# Patient Record
Sex: MALE | Race: WHITE | NOT HISPANIC OR LATINO | Employment: STUDENT | URBAN - METROPOLITAN AREA
[De-identification: names, ages, dates, MRNs, and addresses within clinical notes are randomized per-mention and may not be internally consistent; named-entity substitution may affect disease eponyms.]

---

## 2020-12-12 ENCOUNTER — HOSPITAL ENCOUNTER (EMERGENCY)
Facility: HOSPITAL | Age: 20
Discharge: HOME | End: 2020-12-12
Attending: EMERGENCY MEDICINE | Admitting: EMERGENCY MEDICINE
Payer: COMMERCIAL

## 2020-12-12 VITALS
OXYGEN SATURATION: 100 % | SYSTOLIC BLOOD PRESSURE: 136 MMHG | HEART RATE: 77 BPM | RESPIRATION RATE: 18 BRPM | DIASTOLIC BLOOD PRESSURE: 72 MMHG | TEMPERATURE: 98.5 F

## 2020-12-12 DIAGNOSIS — R04.0 EPISTAXIS: Primary | ICD-10-CM

## 2020-12-12 DIAGNOSIS — Z20.822 ENCOUNTER FOR SCREENING LABORATORY TESTING FOR COVID-19 VIRUS: ICD-10-CM

## 2020-12-12 PROCEDURE — U0003 INFECTIOUS AGENT DETECTION BY NUCLEIC ACID (DNA OR RNA); SEVERE ACUTE RESPIRATORY SYNDROME CORONAVIRUS 2 (SARS-COV-2) (CORONAVIRUS DISEASE [COVID-19]), AMPLIFIED PROBE TECHNIQUE, MAKING USE OF HIGH THROUGHPUT TECHNOLOGIES AS DESCRIBED BY CMS-2020-01-R: HCPCS | Performed by: EMERGENCY MEDICINE

## 2020-12-12 PROCEDURE — 99283 EMERGENCY DEPT VISIT LOW MDM: CPT

## 2020-12-12 PROCEDURE — 99282 EMERGENCY DEPT VISIT SF MDM: CPT | Performed by: EMERGENCY MEDICINE

## 2020-12-13 LAB — SARS-COV-2 RNA SPEC QL NAA+PROBE: NOT DETECTED

## 2025-03-14 ENCOUNTER — HOSPITAL ENCOUNTER (EMERGENCY)
Facility: HOSPITAL | Age: 25
Discharge: HOME | End: 2025-03-14
Attending: EMERGENCY MEDICINE
Payer: COMMERCIAL

## 2025-03-14 ENCOUNTER — APPOINTMENT (EMERGENCY)
Dept: CT IMAGING | Facility: HOSPITAL | Age: 25
End: 2025-03-14
Payer: COMMERCIAL

## 2025-03-14 ENCOUNTER — RESULTS FOLLOW-UP (OUTPATIENT)
Dept: EMERGENCY DEPT | Facility: HOSPITAL | Age: 25
End: 2025-03-14

## 2025-03-14 ENCOUNTER — APPOINTMENT (EMERGENCY)
Dept: RADIOLOGY | Facility: HOSPITAL | Age: 25
End: 2025-03-14
Payer: COMMERCIAL

## 2025-03-14 VITALS
BODY MASS INDEX: 22.45 KG/M2 | RESPIRATION RATE: 18 BRPM | SYSTOLIC BLOOD PRESSURE: 111 MMHG | WEIGHT: 148.15 LBS | DIASTOLIC BLOOD PRESSURE: 56 MMHG | OXYGEN SATURATION: 96 % | HEART RATE: 96 BPM | HEIGHT: 68 IN | TEMPERATURE: 98.5 F

## 2025-03-14 DIAGNOSIS — A41.9 SEPSIS (HCC): ICD-10-CM

## 2025-03-14 DIAGNOSIS — K52.9 ENTERITIS: Primary | ICD-10-CM

## 2025-03-14 DIAGNOSIS — E83.42 HYPOMAGNESEMIA: ICD-10-CM

## 2025-03-14 LAB
ABO GROUP BLD: NORMAL
ABO GROUP BLD: NORMAL
ALBUMIN SERPL BCG-MCNC: 5 G/DL (ref 3.5–5)
ALP SERPL-CCNC: 58 U/L (ref 34–104)
ALT SERPL W P-5'-P-CCNC: 22 U/L (ref 7–52)
ANION GAP SERPL CALCULATED.3IONS-SCNC: 11 MMOL/L (ref 4–13)
APTT PPP: 26 SECONDS (ref 23–34)
AST SERPL W P-5'-P-CCNC: 24 U/L (ref 13–39)
BACTERIA UR QL AUTO: ABNORMAL /HPF
BASOPHILS # BLD MANUAL: 0.2 THOUSAND/UL (ref 0–0.1)
BASOPHILS NFR MAR MANUAL: 1 % (ref 0–1)
BILIRUB SERPL-MCNC: 0.99 MG/DL (ref 0.2–1)
BILIRUB UR QL STRIP: NEGATIVE
BLD GP AB SCN SERPL QL: NEGATIVE
BUN SERPL-MCNC: 21 MG/DL (ref 5–25)
CALCIUM SERPL-MCNC: 10 MG/DL (ref 8.4–10.2)
CHLORIDE SERPL-SCNC: 104 MMOL/L (ref 96–108)
CLARITY UR: CLEAR
CO2 SERPL-SCNC: 23 MMOL/L (ref 21–32)
COLOR UR: ABNORMAL
CREAT SERPL-MCNC: 1.03 MG/DL (ref 0.6–1.3)
EOSINOPHIL # BLD MANUAL: 0 THOUSAND/UL (ref 0–0.4)
EOSINOPHIL NFR BLD MANUAL: 0 % (ref 0–6)
ERYTHROCYTE [DISTWIDTH] IN BLOOD BY AUTOMATED COUNT: 13 % (ref 11.6–15.1)
GFR SERPL CREATININE-BSD FRML MDRD: 100 ML/MIN/1.73SQ M
GLUCOSE SERPL-MCNC: 138 MG/DL (ref 65–140)
GLUCOSE UR STRIP-MCNC: NEGATIVE MG/DL
HCT VFR BLD AUTO: 51.2 % (ref 36.5–49.3)
HGB BLD-MCNC: 17.5 G/DL (ref 12–17)
HGB UR QL STRIP.AUTO: NEGATIVE
INR PPP: 1.01 (ref 0.85–1.19)
KETONES UR STRIP-MCNC: NEGATIVE MG/DL
LACTATE SERPL-SCNC: 1.1 MMOL/L (ref 0.5–2)
LEUKOCYTE ESTERASE UR QL STRIP: NEGATIVE
LG PLATELETS BLD QL SMEAR: PRESENT
LIPASE SERPL-CCNC: 16 U/L (ref 11–82)
LYMPHOCYTES # BLD AUTO: 0.41 THOUSAND/UL (ref 0.6–4.47)
LYMPHOCYTES # BLD AUTO: 2 % (ref 14–44)
MAGNESIUM SERPL-MCNC: 1.6 MG/DL (ref 1.9–2.7)
MCH RBC QN AUTO: 30.8 PG (ref 26.8–34.3)
MCHC RBC AUTO-ENTMCNC: 34.2 G/DL (ref 31.4–37.4)
MCV RBC AUTO: 90 FL (ref 82–98)
MONOCYTES # BLD AUTO: 1.02 THOUSAND/UL (ref 0–1.22)
MONOCYTES NFR BLD: 5 % (ref 4–12)
NEUTROPHILS # BLD MANUAL: 18.81 THOUSAND/UL (ref 1.85–7.62)
NEUTS BAND NFR BLD MANUAL: 8 % (ref 0–8)
NEUTS SEG NFR BLD AUTO: 84 % (ref 43–75)
NITRITE UR QL STRIP: NEGATIVE
NON-SQ EPI CELLS URNS QL MICRO: ABNORMAL /HPF
PH UR STRIP.AUTO: 6.5 [PH]
PLATELET # BLD AUTO: 239 THOUSANDS/UL (ref 149–390)
PLATELET BLD QL SMEAR: ADEQUATE
PMV BLD AUTO: 10 FL (ref 8.9–12.7)
POTASSIUM SERPL-SCNC: 4.4 MMOL/L (ref 3.5–5.3)
PROCALCITONIN SERPL-MCNC: 2.32 NG/ML
PROT SERPL-MCNC: 7.7 G/DL (ref 6.4–8.4)
PROT UR STRIP-MCNC: ABNORMAL MG/DL
PROTHROMBIN TIME: 14 SECONDS (ref 12.3–15)
RBC # BLD AUTO: 5.69 MILLION/UL (ref 3.88–5.62)
RBC #/AREA URNS AUTO: ABNORMAL /HPF
RBC MORPH BLD: NORMAL
RH BLD: POSITIVE
RH BLD: POSITIVE
SODIUM SERPL-SCNC: 138 MMOL/L (ref 135–147)
SP GR UR STRIP.AUTO: >=1.05 (ref 1–1.03)
SPECIMEN EXPIRATION DATE: NORMAL
UROBILINOGEN UR STRIP-ACNC: <2 MG/DL
WBC # BLD AUTO: 20.45 THOUSAND/UL (ref 4.31–10.16)
WBC #/AREA URNS AUTO: ABNORMAL /HPF

## 2025-03-14 PROCEDURE — 83605 ASSAY OF LACTIC ACID: CPT

## 2025-03-14 PROCEDURE — 83735 ASSAY OF MAGNESIUM: CPT | Performed by: EMERGENCY MEDICINE

## 2025-03-14 PROCEDURE — 71046 X-RAY EXAM CHEST 2 VIEWS: CPT

## 2025-03-14 PROCEDURE — 99285 EMERGENCY DEPT VISIT HI MDM: CPT | Performed by: EMERGENCY MEDICINE

## 2025-03-14 PROCEDURE — 85730 THROMBOPLASTIN TIME PARTIAL: CPT

## 2025-03-14 PROCEDURE — 36415 COLL VENOUS BLD VENIPUNCTURE: CPT

## 2025-03-14 PROCEDURE — 96361 HYDRATE IV INFUSION ADD-ON: CPT

## 2025-03-14 PROCEDURE — 96375 TX/PRO/DX INJ NEW DRUG ADDON: CPT

## 2025-03-14 PROCEDURE — 99284 EMERGENCY DEPT VISIT MOD MDM: CPT

## 2025-03-14 PROCEDURE — 81001 URINALYSIS AUTO W/SCOPE: CPT

## 2025-03-14 PROCEDURE — 80053 COMPREHEN METABOLIC PANEL: CPT

## 2025-03-14 PROCEDURE — 85610 PROTHROMBIN TIME: CPT

## 2025-03-14 PROCEDURE — 87040 BLOOD CULTURE FOR BACTERIA: CPT

## 2025-03-14 PROCEDURE — 83690 ASSAY OF LIPASE: CPT

## 2025-03-14 PROCEDURE — 74177 CT ABD & PELVIS W/CONTRAST: CPT

## 2025-03-14 PROCEDURE — 86900 BLOOD TYPING SEROLOGIC ABO: CPT

## 2025-03-14 PROCEDURE — 86850 RBC ANTIBODY SCREEN: CPT

## 2025-03-14 PROCEDURE — 84145 PROCALCITONIN (PCT): CPT

## 2025-03-14 PROCEDURE — 86901 BLOOD TYPING SEROLOGIC RH(D): CPT

## 2025-03-14 PROCEDURE — 85007 BL SMEAR W/DIFF WBC COUNT: CPT

## 2025-03-14 PROCEDURE — 85027 COMPLETE CBC AUTOMATED: CPT

## 2025-03-14 PROCEDURE — 96365 THER/PROPH/DIAG IV INF INIT: CPT

## 2025-03-14 RX ORDER — CIPROFLOXACIN 500 MG/1
500 TABLET, FILM COATED ORAL 2 TIMES DAILY
Qty: 14 TABLET | Refills: 0 | Status: SHIPPED | OUTPATIENT
Start: 2025-03-14 | End: 2025-03-21

## 2025-03-14 RX ORDER — METRONIDAZOLE 500 MG/1
500 TABLET ORAL ONCE
Status: COMPLETED | OUTPATIENT
Start: 2025-03-14 | End: 2025-03-14

## 2025-03-14 RX ORDER — SODIUM CHLORIDE 9 MG/ML
150 INJECTION, SOLUTION INTRAVENOUS CONTINUOUS
Status: DISCONTINUED | OUTPATIENT
Start: 2025-03-14 | End: 2025-03-14 | Stop reason: HOSPADM

## 2025-03-14 RX ORDER — ONDANSETRON 4 MG/1
4 TABLET, ORALLY DISINTEGRATING ORAL EVERY 6 HOURS PRN
Qty: 20 TABLET | Refills: 0 | Status: SHIPPED | OUTPATIENT
Start: 2025-03-14 | End: 2025-03-14

## 2025-03-14 RX ORDER — ACETAMINOPHEN 10 MG/ML
1000 INJECTION, SOLUTION INTRAVENOUS ONCE
Status: COMPLETED | OUTPATIENT
Start: 2025-03-14 | End: 2025-03-14

## 2025-03-14 RX ORDER — CIPROFLOXACIN 500 MG/1
500 TABLET, FILM COATED ORAL ONCE
Status: COMPLETED | OUTPATIENT
Start: 2025-03-14 | End: 2025-03-14

## 2025-03-14 RX ORDER — ONDANSETRON 4 MG/1
4 TABLET, ORALLY DISINTEGRATING ORAL EVERY 6 HOURS PRN
Qty: 20 TABLET | Refills: 0 | Status: SHIPPED | OUTPATIENT
Start: 2025-03-14 | End: 2025-03-21

## 2025-03-14 RX ORDER — MAGNESIUM SULFATE HEPTAHYDRATE 40 MG/ML
2 INJECTION, SOLUTION INTRAVENOUS ONCE
Status: DISCONTINUED | OUTPATIENT
Start: 2025-03-14 | End: 2025-03-14 | Stop reason: HOSPADM

## 2025-03-14 RX ORDER — METRONIDAZOLE 500 MG/1
500 TABLET ORAL EVERY 8 HOURS SCHEDULED
Qty: 21 TABLET | Refills: 0 | Status: SHIPPED | OUTPATIENT
Start: 2025-03-14 | End: 2025-03-21

## 2025-03-14 RX ORDER — DROPERIDOL 2.5 MG/ML
0.62 INJECTION, SOLUTION INTRAMUSCULAR; INTRAVENOUS ONCE
Status: COMPLETED | OUTPATIENT
Start: 2025-03-14 | End: 2025-03-14

## 2025-03-14 RX ADMIN — SODIUM CHLORIDE 1000 ML: 0.9 INJECTION, SOLUTION INTRAVENOUS at 02:34

## 2025-03-14 RX ADMIN — SODIUM CHLORIDE 1000 ML: 0.9 INJECTION, SOLUTION INTRAVENOUS at 01:16

## 2025-03-14 RX ADMIN — METRONIDAZOLE 500 MG: 500 TABLET ORAL at 05:06

## 2025-03-14 RX ADMIN — ACETAMINOPHEN 1000 MG: 10 INJECTION INTRAVENOUS at 01:34

## 2025-03-14 RX ADMIN — DROPERIDOL 0.62 MG: 2.5 INJECTION, SOLUTION INTRAMUSCULAR; INTRAVENOUS at 01:33

## 2025-03-14 RX ADMIN — IOHEXOL 90 ML: 350 INJECTION, SOLUTION INTRAVENOUS at 02:57

## 2025-03-14 RX ADMIN — CIPROFLOXACIN 500 MG: 500 TABLET ORAL at 05:06

## 2025-03-14 NOTE — DISCHARGE INSTRUCTIONS
Please have a low threshold to return to the Emergency Department. Return for worsening abdominal pain, fevers, chills, vomiting, diarrhea, chest pain, difficulty breathing.

## 2025-03-14 NOTE — SEPSIS NOTE
Sepsis Note   David Santiago 24 y.o. male MRN: [id number]  Unit/Bed#: ED-40 Encounter: [id number]       Initial Sepsis Screening       Row Name 03/14/25 0728                Is the patient's history suggestive of a new or worsening infection? Yes (Proceed)  -ROBERTO        Suspected source of infection acute abdominal infection  -ROBERTO        Indicate SIRS criteria Tachycardia > 90 bpm;Leukocytosis (WBC > 91220 IJL) OR Leukopenia (WBC <4000 IJL) OR Bandemia (WBC >10% bands)  -ROBERTO        Are two or more of the above signs & symptoms of infection both present and new to the patient? Yes (Proceed)  -ROBERTO        Assess for evidence of organ dysfunction: Are any of the below criteria present within 6 hours of suspected infection and SIRS criteria that are NOT considered to be chronic conditions? --                  User Key  (r) = Recorded By, (t) = Taken By, (c) = Cosigned By      Initials Name Provider Type    ROBERTO JEFFRIES DO Resident                        Body mass index is 22.53 kg/m².  Wt Readings from Last 1 Encounters:   03/14/25 67.2 kg (148 lb 2.4 oz)     IBW (Ideal Body Weight): 68.4 kg    Ideal body weight: 68.4 kg (150 lb 12.7 oz)

## 2025-03-14 NOTE — ED PROVIDER NOTES
"Time reflects when diagnosis was documented in both MDM as applicable and the Disposition within this note       Time User Action Codes Description Comment    3/14/2025  4:42 AM Cali Bautista [K52.9] Enteritis     3/14/2025  4:42 AM Cali Bautista [A41.9] Sepsis (HCC)     3/14/2025  4:42 AM Cali Bautista [E83.42] Hypomagnesemia           ED Disposition       ED Disposition   AMA    Condition   --    Date/Time   Fri Mar 14, 2025  4:42 AM    Comment   Date: 3/14/2025  Patient: David Santiago  Admitted: 3/14/2025 12:45 AM  Attending Provider: Roderick Berman MD    David Santiago or his authorized caregiver has made the decision for the patient to leave the emergency department against the advice of h is attending physician. He or his authorized caregiver has been informed and understands the inherent risks, including death, worsening of his condition, worsening abdominal infection, worsening sepsis.  He or his authorized caregiver has decided to  accept the responsibility for this decision. David Santiago and all necessary parties have been advised that he may return for further evaluation or treatment. His condition at time of discharge was guarded.  David Santiago had current vital signs as follo ws:  /56 (BP Location: Left arm)   Pulse 96   Temp 98.5 °F (36.9 °C)   Resp 18   Ht 5' 8\" (1.727 m)   Wt 67.2 kg (148 lb 2.4 oz)                Assessment & Plan       Medical Decision Making  Patient is a 24 year old male, presenting to the Emergency Department for evaluation of abdominal pain, ongoing for the past 3 days, with vomiting and diarrhea beginning tonight.    Differential diagnoses included but not limited to: gastritis, gastroenteritis, diverticulitis, appendicitis, cholecystitis, cholelithiasis.    Orders written for labs, urinalysis.    Patient initially treated with IV fluids, IV Tylenol and droperidol.    See ED course for full details.    Magnesium noted to be low at 1.9. Will " replete with 2g IV.     Labs notable for an elevated WBC of 20. Given elevated WBC and HR>90, patient meeting SIRS criteria.   Lactic acid and blood cultures ordered. CXR added.    CT Abdomen/Pelvis with IV added for further evaluation.    On re-evaluation, patient reports feeling markedly improved, and is tolerating PO fluids without difficulty.  Patient has not had any recurrence of diarrhea while in the ED.    CT results notable for findings of enteritis. Findings were reviewed and discussed with the patient, who verbalized understanding and was given the opportunity to ask questions.   Given patient's SIRS plus CT findings of enteritis, patient meeting criteria for sepsis. Not severe sepsis given normal lactic acid level.   Discussed the plan for admission, IV antibiotics and continued monitoring with the patient.  The patient states that he feels much better at this time and wishes to be discharged home.  I had an extensive conversation with the patient regarding the risks of leaving the hospital with a known diagnosis of enteritis meeting sepsis criteria. The patient states he understands the risks and is willing to sign out against medical advice.   The patient is oriented x3, has full capacity to make his own decisions, and understands the risks of leaving without the appropriate treatment. AMA form signed.   Discussed with the patient that he will be prescribed oral antibiotics for his enteritis, with his first dose being given here.   Patient treated with Ciprofloxacin and Flagyl PO, with prescriptions sent to his pharmacy, as well as Zofran.     The patient appears well, is in no acute distress, and is tolerating PO fluids without difficulty.  He has a soft nontender abdomen and offers no acute complaints.   I advised the patient on the emergent signs and symptoms for which he should urgently return to the ED, and encouraged continued follow up with his PCP within 1 week for re-evaluation of symptoms.    Patient advised to have a low threshold to return to the emergency department for any worsening pain, fevers, continued vomiting or diarrhea.     Amount and/or Complexity of Data Reviewed  Labs: ordered. Decision-making details documented in ED Course.  Radiology: ordered. Decision-making details documented in ED Course.    Risk  Prescription drug management.        ED Course as of 03/14/25 0729   Fri Mar 14, 2025   0710 UA w Reflex to Microscopic w Reflex to Culture(!)  Protein in urine, elevated specific gravity. No evidence of infection.    0711 LACTIC ACID: 1.1   0711 CBC and differential(!)  Evidence of leukocytosis. Elevation in hemoglobin/hematocrit, RBCs - possible hemoconcentration secondary to dehydration?    0711 LIPASE: 16   0711 XR chest 2 views  IMPRESSION:     No acute cardiopulmonary disease.     Workstation performed: [id number]     0712 CT abdomen pelvis with contrast  IMPRESSION:  Fluid-filled distal small bowel loops which may be due to nonspecific enteritis.  Workstation performed: [id number]     0712 Stool Enteric Bacterial Panel by PCR  Patient unable to provide a stool sample for stool testing    0712 MAGNESIUM(!): 1.6  Repleted with 2g IV       Medications   sodium chloride 0.9 % bolus 1,000 mL (0 mL Intravenous Stopped 3/14/25 0230)   acetaminophen (Ofirmev) injection 1,000 mg (0 mg Intravenous Stopped 3/14/25 0230)   droperidol (INAPSINE) injection 0.625 mg (0.625 mg Intravenous Given 3/14/25 0133)   sodium chloride 0.9 % bolus 1,000 mL (0 mL Intravenous Stopped 3/14/25 0411)   iohexol (OMNIPAQUE) 350 MG/ML injection (MULTI-DOSE) 90 mL (90 mL Intravenous Given 3/14/25 0257)   ciprofloxacin (CIPRO) tablet 500 mg (500 mg Oral Given 3/14/25 0506)   metroNIDAZOLE (FLAGYL) tablet 500 mg (500 mg Oral Given 3/14/25 0506)       ED Risk Strat Scores                            SBIRT 20yo+      Flowsheet Row Most Recent Value   Initial Alcohol Screen: US AUDIT-C     1. How often do you have a drink  containing alcohol? 0 Filed at: 03/14/2025 0053   2. How many drinks containing alcohol do you have on a typical day you are drinking?  0 Filed at: 03/14/2025 0053   3a. Male UNDER 65: How often do you have five or more drinks on one occasion? 0 Filed at: 03/14/2025 0053   3b. FEMALE Any Age, or MALE 65+: How often do you have 4 or more drinks on one occassion? 0 Filed at: 03/14/2025 0053   Audit-C Score 0 Filed at: 03/14/2025 0053   MICHELLE: How many times in the past year have you...    Used an illegal drug or used a prescription medication for non-medical reasons? Never Filed at: 03/14/2025 0053                            History of Present Illness       Chief Complaint   Patient presents with    Vomiting     Vomited about 7 times in 20 min and states he vomited blood the 7th time. Diarrhea 15 times in the past few hours.        History reviewed. No pertinent past medical history.   History reviewed. No pertinent surgical history.   History reviewed. No pertinent family history.   Social History     Tobacco Use    Smoking status: Some Days    Smokeless tobacco: Never   Substance Use Topics    Alcohol use: Never    Drug use: Never      E-Cigarette/Vaping      E-Cigarette/Vaping Substances      I have reviewed and agree with the history as documented.     HPI  Patient is a 24 year old male, with no past medical history, who presents to the Emergency Department for evaluation of intermittent abdominal pain, ongoing for the last 3 days. Describes the pain as intermittent, upper, epigastric pain, with no aggravating or alleviating factors, that occasionally radiates upwards. He states that this afternoon around 4 PM he started to develop some nausea, vomiting, and diarrhea. He states since this afternoon he has had approximately 6-7 episodes of vomiting, and states that the last episode had some forceful vomiting with blood-tinged emesis. States multiple episodes of non-bloody, watery diarrhea. Patient denies any fevers,  chills, chest pain, difficulty breathing, sick contacts. Patient reports that he frequently smokes nicotine from his vape pen daily. He reports occasional marijuana use, and denies any chronic alcohol use.     Review of Systems   All other systems reviewed and are negative.          Objective       ED Triage Vitals   Temperature Pulse Blood Pressure Respirations SpO2 Patient Position - Orthostatic VS   03/14/25 0053 03/14/25 0052 03/14/25 0052 03/14/25 0052 03/14/25 0052 --   98.5 °F (36.9 °C) 98 119/81 16 99 %       Temp src Heart Rate Source BP Location FiO2 (%) Pain Score    -- -- 03/14/25 0300 -- --      Left arm        Vitals      Date and Time Temp Pulse SpO2 Resp BP Pain Score FACES Pain Rating User   03/14/25 0300 -- 96 96 % 18 111/56 -- -- KB   03/14/25 0053 98.5 °F (36.9 °C) -- -- -- -- -- -- MELISA   03/14/25 0052 -- 98 99 % 16 119/81 -- -- [facility]            Physical Exam  Vitals reviewed.   Constitutional:       General: He is not in acute distress.     Appearance: Normal appearance. He is not ill-appearing, toxic-appearing or diaphoretic.   HENT:      Head: Normocephalic and atraumatic.   Cardiovascular:      Rate and Rhythm: Normal rate and regular rhythm.      Heart sounds: Normal heart sounds. No murmur heard.     No friction rub. No gallop.   Pulmonary:      Breath sounds: Normal breath sounds. No wheezing, rhonchi or rales.   Abdominal:      Palpations: Abdomen is soft.      Tenderness: There is abdominal tenderness.      Comments: Minimal epigastric tenderness    Musculoskeletal:         General: Normal range of motion.      Cervical back: Normal range of motion and neck supple.   Skin:     General: Skin is warm.      Capillary Refill: Capillary refill takes less than 2 seconds.   Neurological:      Mental Status: He is alert and oriented to person, place, and time.   Psychiatric:         Mood and Affect: Mood normal.         Behavior: Behavior normal.         Thought Content: Thought content normal.          Judgment: Judgment normal.         Results Reviewed       Procedure Component Value Units Date/Time    Procalcitonin [[id number]]  (Abnormal) Collected: 03/14/25 0130    Lab Status: Final result Specimen: Blood from Arm, Right Updated: 03/14/25 0533     Procalcitonin 2.32 ng/ml     Urine Microscopic [[id number]]  (Abnormal) Collected: 03/14/25 0411    Lab Status: Final result Specimen: Urine, Clean Catch Updated: 03/14/25 0435     RBC, UA 2-4 /hpf      WBC, UA None Seen /hpf      Epithelial Cells None Seen /hpf      Bacteria, UA Occasional /hpf     UA w Reflex to Microscopic w Reflex to Culture [[id number]]  (Abnormal) Collected: 03/14/25 0411    Lab Status: Final result Specimen: Urine, Clean Catch Updated: 03/14/25 0432     Color, UA Light Yellow     Clarity, UA Clear     Specific Gravity, UA >=1.050     pH, UA 6.5     Leukocytes, UA Negative     Nitrite, UA Negative     Protein, UA 30 (1+) mg/dl      Glucose, UA Negative mg/dl      Ketones, UA Negative mg/dl      Urobilinogen, UA <2.0 mg/dl      Bilirubin, UA Negative     Occult Blood, UA Negative    RBC Morphology Reflex Test [[id number]] Collected: 03/14/25 0130    Lab Status: Final result Specimen: Blood from Arm, Right Updated: 03/14/25 0301    Blood culture #1 [[id number]] Collected: 03/14/25 0223    Lab Status: In process Specimen: Blood from Arm, Right Updated: 03/14/25 0239    Blood culture #2 [[id number]] Collected: 03/14/25 0230    Lab Status: In process Specimen: Blood from Hand, Right Updated: 03/14/25 0239    Lactic acid, plasma (w/reflex if result > 2.0) [[id number]]  (Normal) Collected: 03/14/25 0130    Lab Status: Final result Specimen: Blood from Arm, Right Updated: 03/14/25 0229     LACTIC ACID 1.1 mmol/L     Narrative:      Result may be elevated if tourniquet was used during collection.    CBC and differential [[id number]]  (Abnormal) Collected: 03/14/25 0130    Lab Status: Final result Specimen: Blood from Arm, Right Updated: 03/14/25 0221      WBC 20.45 Thousand/uL      RBC 5.69 Million/uL      Hemoglobin 17.5 g/dL      Hematocrit 51.2 %      MCV 90 fL      MCH 30.8 pg      MCHC 34.2 g/dL      RDW 13.0 %      MPV 10.0 fL      Platelets 239 Thousands/uL     Narrative:      This is an appended report.  These results have been appended to a previously verified report.    Manual Differential(PHLEBS Do Not Order) [[id number]]  (Abnormal) Collected: 03/14/25 0130    Lab Status: Final result Specimen: Blood from Arm, Right Updated: 03/14/25 0221     Segmented % 84 %      Bands % 8 %      Lymphocytes % 2 %      Monocytes % 5 %      Eosinophils % 0 %      Basophils % 1 %      Absolute Neutrophils 18.81 Thousand/uL      Absolute Lymphocytes 0.41 Thousand/uL      Absolute Monocytes 1.02 Thousand/uL      Absolute Eosinophils 0.00 Thousand/uL      Absolute Basophils 0.20 Thousand/uL      Total Counted --     RBC Morphology Normal     Platelet Estimate Adequate     Large Platelet Present    Magnesium [[id number]]  (Abnormal) Collected: 03/14/25 0130    Lab Status: Final result Specimen: Blood from Arm, Right Updated: 03/14/25 0213     Magnesium 1.6 mg/dL     Comprehensive metabolic panel [[id number]] Collected: 03/14/25 0130    Lab Status: Final result Specimen: Blood from Arm, Right Updated: 03/14/25 0213     Sodium 138 mmol/L      Potassium 4.4 mmol/L      Chloride 104 mmol/L      CO2 23 mmol/L      ANION GAP 11 mmol/L      BUN 21 mg/dL      Creatinine 1.03 mg/dL      Glucose 138 mg/dL      Calcium 10.0 mg/dL      AST 24 U/L      ALT 22 U/L      Alkaline Phosphatase 58 U/L      Total Protein 7.7 g/dL      Albumin 5.0 g/dL      Total Bilirubin 0.99 mg/dL      eGFR 100 ml/min/1.73sq m     Narrative:      [employer] guidelines for Chronic Kidney Disease (CKD):     Stage 1 with normal or high GFR (GFR > 90 mL/min/1.73 square meters)    Stage 2 Mild CKD (GFR = 60-89 mL/min/1.73 square meters)    Stage 3A Moderate CKD (GFR = 45-59 mL/min/1.73 square  meters)    Stage 3B Moderate CKD (GFR = 30-44 mL/min/1.73 square meters)    Stage 4 Severe CKD (GFR = 15-29 mL/min/1.73 square meters)    Stage 5 End Stage CKD (GFR <15 mL/min/1.73 square meters)  Note: GFR calculation is accurate only with a steady state creatinine    Lipase [[id number]]  (Normal) Collected: 03/14/25 0130    Lab Status: Final result Specimen: Blood from Arm, Right Updated: 03/14/25 0213     Lipase 16 u/L     Protime-INR [[id number]]  (Normal) Collected: 03/14/25 0130    Lab Status: Final result Specimen: Blood from Arm, Right Updated: 03/14/25 0203     Protime 14.0 seconds      INR 1.01    Narrative:      INR Therapeutic Range    Indication                                             INR Range      Atrial Fibrillation                                               2.0-3.0  Hypercoagulable State                                    2.0.2.3  Left Ventricular Asist Device                            2.0-3.0  Mechanical Heart Valve                                  -    Aortic(with afib, MI, embolism, HF, LA enlargement,    and/or coagulopathy)                                     2.0-3.0 (2.5-3.5)     Mitral                                                             2.5-3.5  Prosthetic/Bioprosthetic Heart Valve               2.0-3.0  Venous thromboembolism (VTE: VT, PE        2.0-3.0    APTT [[id number]]  (Normal) Collected: 03/14/25 0130    Lab Status: Final result Specimen: Blood from Arm, Right Updated: 03/14/25 0203     PTT 26 seconds     Stool Enteric Bacterial Panel by PCR [[id number]]     Lab Status: No result Specimen: Stool from Rectum     Clostridioides difficile toxin by PCR with EIA [[id number]]     Lab Status: No result Specimen: Stool from Per Rectum             CT abdomen pelvis with contrast   Final Interpretation by Duncan Hoyos MD (03/14 0428)      Fluid-filled distal small bowel loops which may be due to nonspecific enteritis.         Workstation performed: [id number]         XR chest 2  views   Final Interpretation by Patricia Amaya MD (03/14 7781)      No acute cardiopulmonary disease.            Workstation performed: [id number]             Procedures    ED Medication and Procedure Management   None     Discharge Medication List as of 3/14/2025  4:45 AM        START taking these medications    Details   ciprofloxacin (CIPRO) 500 mg tablet Take 1 tablet (500 mg total) by mouth 2 (two) times a day for 7 days, Starting Fri 3/14/2025, Until Fri 3/21/2025, Normal      metroNIDAZOLE (FLAGYL) 500 mg tablet Take 1 tablet (500 mg total) by mouth every 8 (eight) hours for 7 days, Starting Fri 3/14/2025, Until Fri 3/21/2025, Normal           CONTINUE these medications which have CHANGED    Details   ondansetron (ZOFRAN-ODT) 4 mg disintegrating tablet Take 1 tablet (4 mg total) by mouth every 6 (six) hours as needed for nausea or vomiting for up to 7 days, Starting Fri 3/14/2025, Until Fri 3/21/2025 at 2359, Normal           No discharge procedures on file.  ED SEPSIS DOCUMENTATION   Time reflects when diagnosis was documented in both MDM as applicable and the Disposition within this note       Time User Action Codes Description Comment    3/14/2025  4:42 AM Cali Bautista [K52.9] Enteritis     3/14/2025  4:42 AM Cali Bautista [A41.9] Sepsis (HCC)     3/14/2025  4:42 AM Cali Bautista Add [E83.42] Hypomagnesemia            Initial Sepsis Screening       Row Name 03/14/25 0728                Is the patient's history suggestive of a new or worsening infection? Yes (Proceed)  -ROBERTO        Suspected source of infection acute abdominal infection  -ROBERTO        Indicate SIRS criteria Tachycardia > 90 bpm;Leukocytosis (WBC > 99858 IJL) OR Leukopenia (WBC <4000 IJL) OR Bandemia (WBC >10% bands)  -ROBERTO        Are two or more of the above signs & symptoms of infection both present and new to the patient? Yes (Proceed)  -ROBERTO        Assess for evidence of organ dysfunction: Are any of the below criteria  "present within 6 hours of suspected infection and SIRS criteria that are NOT considered to be chronic conditions? --                  User Key  (r) = Recorded By, (t) = Taken By, (c) = Cosigned By      Initials Name Provider Type    DO Zohra Patten                  Default Flowsheet Data (Last 720 Hours)       Sepsis Reassess       Row Name 03/14/25 0728                   Repeat Volume Status and Tissue Perfusion Assessment Performed    Date of Reassessment: 03/14/25  -ROBERTO        Time of Reassessment: 0430  -        Sepsis Reassessment Note: Click \"NEXT\" below (NOT \"close\") to generate sepsis reassessment note. YES (proceed by clicking \"NEXT\")  -ROBERTO        Repeat Volume Status and Tissue Perfusion Assessment Performed --                  User Key  (r) = Recorded By, (t) = Taken By, (c) = Cosigned By      Initials Name Provider Type    ROBERTO JEFFRIES DO Resident                     Cali Bautista V,   03/14/25 0725       Cali Bautista V,   03/14/25 0729    "

## 2025-03-14 NOTE — Clinical Note
David Santiago was seen and treated in our emergency department on 3/14/2025.                Diagnosis:     David  may return to work on return date.    He may return on this date: 03/17/2025         If you have any questions or concerns, please don't hesitate to call.      Cali Bautista V, DO    ______________________________           _______________          _______________  Hospital Representative                              Date                                Time

## 2025-03-14 NOTE — SEPSIS NOTE
"  Sepsis Note   David Santiago 24 y.o. male MRN: [id number]  Unit/Bed#: ED-40 Encounter: [id number]       Initial Sepsis Screening       Row Name 03/14/25 0728                Is the patient's history suggestive of a new or worsening infection? Yes (Proceed)  -ROBERTO        Suspected source of infection acute abdominal infection  -ROBERTO        Indicate SIRS criteria Tachycardia > 90 bpm;Leukocytosis (WBC > 13636 IJL) OR Leukopenia (WBC <4000 IJL) OR Bandemia (WBC >10% bands)  -ROBERTO        Are two or more of the above signs & symptoms of infection both present and new to the patient? Yes (Proceed)  -ROBERTO        Assess for evidence of organ dysfunction: Are any of the below criteria present within 6 hours of suspected infection and SIRS criteria that are NOT considered to be chronic conditions? --                  User Key  (r) = Recorded By, (t) = Taken By, (c) = Cosigned By      Initials Name Provider Type    ROBERTO JEFFRIES DO Resident                    Default Flowsheet Data (Last 720 Hours)       Sepsis Reassess       Row Name 03/14/25 0728                   Repeat Volume Status and Tissue Perfusion Assessment Performed    Date of Reassessment: 03/14/25  -        Time of Reassessment: 0430  -        Sepsis Reassessment Note: Click \"NEXT\" below (NOT \"close\") to generate sepsis reassessment note. YES (proceed by clicking \"NEXT\")  -ROBERTO        Repeat Volume Status and Tissue Perfusion Assessment Performed --                  User Key  (r) = Recorded By, (t) = Taken By, (c) = Cosigned By      Initials Name Provider Type    ROBERTO JEFFRIES DO Resident                    Body mass index is 22.53 kg/m².  Wt Readings from Last 1 Encounters:   03/14/25 67.2 kg (148 lb 2.4 oz)     IBW (Ideal Body Weight): 68.4 kg    Ideal body weight: 68.4 kg (150 lb 12.7 oz)    "

## 2025-03-14 NOTE — ED ATTENDING ATTESTATION
3/14/2025  I, Roderick Berman MD, saw and evaluated the patient. I have discussed the patient with the resident/non-physician practitioner and agree with the resident's/non-physician practitioner's findings, Plan of Care, and MDM as documented in the resident's/non-physician practitioner's note, except where noted. All available labs and Radiology studies were reviewed.  I was present for key portions of any procedure(s) performed by the resident/non-physician practitioner and I was immediately available to provide assistance.       At this point I agree with the current assessment done in the Emergency Department.  I have conducted an independent evaluation of this patient a history and physical is as follows: Patient is a 24 year old male with intermittent abdominal pain for past few days with worsening since yesterday with N/V/D. (+) vomited slight amount of blood after 7th vomiting episode. No travel. No known ill contacts. No anticoagulant use or NSAID use. Rare etoh use. No raw meat, eggs, fish or recent abx use. No abdominal surgery. No urinary sx. No fever. Was last seen at [facility] on 2/27/24 for deviated septum. [facility] website checked on this patient and no Rx found. NCAT. No scleral icterus. No pale conjunctiva. Mucous membranes somewhat moist. Lungs clear. Heart regular without murmur. Abdomen soft and nontender. (+) bowel sounds. No guarding or rebound. No edema. No rash noted. No jaundice. DDx including but not limited to: gastroenteritis, food poisoning, metabolic abnormality, dehydration, ROGER, mesenteric adenitis, appendicitis, IBD, IBS, ileus, bowel obstruction, toxic megacolon, colitis, enteritis, gastritis, PUD, GERD, hepatitis, pancreatitis; doubt cholecystitis, biliary colic, choledocholithiasis; doubt splenic etiology; renal colic, pyelonephritis; UTI. Differential diagnosis including but not limited to: upper GI bleed, esophageal varices; doubt diverticulosis, AVM, hemorrhoids;  anemia, coagulopathy, liver disease; doubt tumor, anal fissure. Will check labs and give IVFs, IV droperidol, IV pepcid.     ED Course         Critical Care Time  Procedures

## 2025-03-16 LAB
BACTERIA BLD CULT: NORMAL
BACTERIA BLD CULT: NORMAL

## 2025-03-19 LAB
BACTERIA BLD CULT: NORMAL
BACTERIA BLD CULT: NORMAL